# Patient Record
Sex: FEMALE | Race: BLACK OR AFRICAN AMERICAN | HISPANIC OR LATINO | Employment: UNEMPLOYED | URBAN - METROPOLITAN AREA
[De-identification: names, ages, dates, MRNs, and addresses within clinical notes are randomized per-mention and may not be internally consistent; named-entity substitution may affect disease eponyms.]

---

## 2023-05-31 ENCOUNTER — OFFICE VISIT (OUTPATIENT)
Dept: OBSTETRICS AND GYNECOLOGY | Facility: CLINIC | Age: 23
End: 2023-05-31

## 2023-05-31 VITALS — WEIGHT: 110.4 LBS

## 2023-05-31 DIAGNOSIS — Z12.4 SCREENING FOR MALIGNANT NEOPLASM OF CERVIX: ICD-10-CM

## 2023-05-31 DIAGNOSIS — Z11.3 SCREEN FOR SEXUALLY TRANSMITTED DISEASES: ICD-10-CM

## 2023-05-31 DIAGNOSIS — K62.89 PERIANAL PAIN: ICD-10-CM

## 2023-05-31 DIAGNOSIS — Z01.411 ENCOUNTER FOR GYNECOLOGICAL EXAMINATION WITH ABNORMAL FINDING: Primary | ICD-10-CM

## 2023-05-31 NOTE — PROGRESS NOTES
Chief Complaint  New Gyn (Patient is here today for an annual exam. )  Also reporting concerns about perineal/perianal pain    Entirety of today's encounter including patient interview, exam, and counseling performed with the aid of a medical  via the telephone.       Subjective        Román Zepeda presents to [facility]  History of Present Illness  Patient is here for annual exam today.  She has not had a checkup since the delivery of her child 2 years ago.  She currently has a Nexplanon for contraception, which was placed just after the delivery of her child 2 and half years ago.  She reports concerns following the delivery of her child.  She says that she had a tear with sutures after the delivery and she never went back for a checkup.  She feels that as a result of this she has been noticing a stitch or pain in the perianal area.  She says that she only seems to notice it during her menstrual cycle.  She says that the area will become swollen and enlarged and will be difficult to sit down.  She denies pain with sexual intercourse.  She denies pain at other times.  She denies intravaginal pain.  She denies irregular discharge.    Menstrual History:  OB History        1    Para   1    Term   1            AB        Living   1       SAB        IAB        Ectopic        Molar        Multiple        Live Births   1              Patient's last menstrual period was 2023 (exact date).     History reviewed. No pertinent past medical history.    History reviewed. No pertinent surgical history.    Social History     Tobacco Use   • Smoking status: Never   Vaping Use   • Vaping Use: Never used   Substance Use Topics   • Alcohol use: Not Currently   • Drug use: Never       Family History   Problem Relation Age of Onset   • Breast cancer Neg Hx    • Ovarian cancer Neg Hx    • Uterine cancer Neg Hx    • Colon cancer Neg Hx        Current Outpatient  Medications on File Prior to Visit   Medication Sig   • Etonogestrel (NEXPLANON) 68 MG implant subdermal implant Inject 1 each into the appropriate area of the skin as directed by provider 1 (One) Time.     No current facility-administered medications on file prior to visit.       No Known Allergies      Review of systems:  Constitutional: No fevers, chills, sweats   Eye: No recent visual problems, denies blurry vision   HEENT: No ear pain, nasal congestion, sore throat, voice changes   Respiratory: No shortness of breath, cough, pain on breathing   Cardiovascular: No Chest pain, palpitations   Gastrointestinal: No nausea, vomiting, diarrhea, constipation   Genitourinary: No hematuria, dysuria, lesions on genitalia   Hema/Lymph: Negative for bruising, no edema   Endocrine: Negative for excessive thirst, excessive hunger, heat or cold intolerance   Musculoskeletal: No joint pain, muscle pain, decreased range of motion   Integumentary: No rash, pruritus, abrasions, lesions   Neurologic: No weakness, numbness, headaches   Psychiatric: No anxiety, depression, mood changes       Objective   Vital Signs:  Wt 50.1 kg (110 lb 6.4 oz)   There is no height or weight on file to calculate BMI.             Physical Exam     Exam performed in the presence of a female chaperone  Patient has provided verbal consent to proceed with exam.    Gen: No acute distress, awake and oriented times three  HENT: Normocephalic, atraumatic, Moist mucous membranes  Eyes: PERRLA, EOMI  Lungs: Normal work of breathing, lungs clear bilaterally  Breast: Symmetrical. No skin changes or nipple retractions. No lumps or masses bilaterally. No tenderness bilaterally.  Abdomen: soft, nontender, no masses or hernia, no hepatosplenomegaly, non distended, normoactive bowel sounds  Normal external female genitalia, no lesions, no masses or nodules  Urethra: Normal meatus, no caruncle  Bladder: nontender  Vagina: No blood or discharge, there is no nodularity  along the rectovaginal septum, there is no pelvic organ prolapse, no signs of fistula  Cervix: No cervical motion tenderness, no lesions, no active bleeding, nonfriable  Uterus: Anteverted, normal size and shape, nontender  Adnexa: No masses or tenderness  External anal exam: Normal appearance, no lesions or hemorrhoids  Rectal: Normal sphincter tone, no perianal masses identified  Skin: Warm and dry, no rashes  Psych: Good judgement and insight, normal affect and mood  Neuro: CN 2-12 intact, no gross deficits        Result Review :                   Assessment and Plan   Diagnoses and all orders for this visit:    1. Encounter for gynecological examination with abnormal finding (Primary)    Pap - Ordered today.  STD screening - Cultures performed today.   Contraception - Options discussed with pt at length. Risks, benefits, side effects, and alternatives to various forms of hormonal, nonhormonal and barrier methods discussed. Pt elects to continue with Nexplanon.  This will be due to be exchanged in about 6 months.  Safe sex practices encouraged with patient.  Breast cancer screening. Patient encouraged to perform routine self breast exams. Recommend yearly clinical breast exam and mammogram starting at age 40.  Recommend pt take calcium and vitamin D supplementation as well as prenatal vitamin or folic acid if she is attempting to conceive.  Encourage aerobic exercise with at least 30 minutes 5 days/wk.  Avoid excessive alcohol use.  Patient is advised to call the office for results after 1 week if she has not seen or heard the results of any tests ordered or performed today.    2. Screening for malignant neoplasm of cervix  -     IGP,CtNgTv,rfx Apt HPV All    3. Screen for sexually transmitted diseases  -     IGP,CtNgTv,rfx Apt HPV All    4. Perianal pain  -     Ambulatory Referral to Colorectal Surgery    I do not see any significant anatomical abnormality on exam today.  Patient did not have any pain on exam.   Cannot really identify the source of her pain or discomfort at this time.  Will recommend referral to colorectal surgery to see if there is anything involving the lower rectum or anus that could be the source of her symptoms.  Patient agrees with plan.           Follow Up   Return in about 6 months (around 11/30/2023) for contraceptive management.  Patient was given instructions and counseling regarding her condition or for health maintenance advice. Please see specific information pulled into the AVS if appropriate.

## 2023-06-05 LAB
C TRACH RRNA CVX QL NAA+PROBE: NEGATIVE
CONV .: NORMAL
CYTOLOGIST CVX/VAG CYTO: NORMAL
CYTOLOGY CVX/VAG DOC CYTO: NORMAL
CYTOLOGY CVX/VAG DOC THIN PREP: NORMAL
DX ICD CODE: NORMAL
HIV 1 & 2 AB SER-IMP: NORMAL
N GONORRHOEA RRNA CVX QL NAA+PROBE: NEGATIVE
OTHER STN SPEC: NORMAL
STAT OF ADQ CVX/VAG CYTO-IMP: NORMAL
T VAGINALIS RRNA SPEC QL NAA+PROBE: NEGATIVE

## 2023-06-07 ENCOUNTER — TELEPHONE (OUTPATIENT)
Dept: OBSTETRICS AND GYNECOLOGY | Facility: CLINIC | Age: 23
End: 2023-06-07
Payer: MEDICAID

## 2023-06-07 NOTE — TELEPHONE ENCOUNTER
----- Message from Sourav Quezada MD sent at 6/6/2023  1:44 PM EDT -----  Please notify the patient that her Pap was normal

## 2023-07-21 ENCOUNTER — TELEPHONE (OUTPATIENT)
Dept: OBSTETRICS AND GYNECOLOGY | Facility: CLINIC | Age: 23
End: 2023-07-21

## 2023-12-11 ENCOUNTER — TELEPHONE (OUTPATIENT)
Dept: OBSTETRICS AND GYNECOLOGY | Facility: CLINIC | Age: 23
End: 2023-12-11
Payer: MEDICAID

## 2023-12-11 NOTE — TELEPHONE ENCOUNTER
Attempted to call pt about n/s on 12/5/23, phone not accepting calls at this time. Letter mailed.[email or web address]